# Patient Record
Sex: FEMALE | Race: WHITE | NOT HISPANIC OR LATINO | URBAN - METROPOLITAN AREA
[De-identification: names, ages, dates, MRNs, and addresses within clinical notes are randomized per-mention and may not be internally consistent; named-entity substitution may affect disease eponyms.]

---

## 2017-09-20 ENCOUNTER — OUTPATIENT (OUTPATIENT)
Dept: OUTPATIENT SERVICES | Facility: HOSPITAL | Age: 62
LOS: 1 days | Discharge: HOME | End: 2017-09-20

## 2017-09-20 DIAGNOSIS — C83.58 LYMPHOBLASTIC (DIFFUSE) LYMPHOMA, LYMPH NODES OF MULTIPLE SITES: ICD-10-CM

## 2017-09-25 ENCOUNTER — OUTPATIENT (OUTPATIENT)
Dept: OUTPATIENT SERVICES | Facility: HOSPITAL | Age: 62
LOS: 1 days | Discharge: HOME | End: 2017-09-25

## 2017-09-25 DIAGNOSIS — R13.10 DYSPHAGIA, UNSPECIFIED: ICD-10-CM

## 2021-08-05 PROBLEM — Z00.00 ENCOUNTER FOR PREVENTIVE HEALTH EXAMINATION: Status: ACTIVE | Noted: 2021-08-05

## 2021-09-30 ENCOUNTER — APPOINTMENT (OUTPATIENT)
Dept: UROGYNECOLOGY | Facility: CLINIC | Age: 66
End: 2021-09-30
Payer: MEDICARE

## 2021-09-30 VITALS
SYSTOLIC BLOOD PRESSURE: 155 MMHG | DIASTOLIC BLOOD PRESSURE: 93 MMHG | HEART RATE: 56 BPM | WEIGHT: 203 LBS | BODY MASS INDEX: 34.66 KG/M2 | HEIGHT: 64 IN

## 2021-09-30 DIAGNOSIS — Z87.19 PERSONAL HISTORY OF OTHER DISEASES OF THE DIGESTIVE SYSTEM: ICD-10-CM

## 2021-09-30 DIAGNOSIS — Z78.9 OTHER SPECIFIED HEALTH STATUS: ICD-10-CM

## 2021-09-30 DIAGNOSIS — Z85.41 PERSONAL HISTORY OF MALIGNANT NEOPLASM OF CERVIX UTERI: ICD-10-CM

## 2021-09-30 DIAGNOSIS — E78.00 PURE HYPERCHOLESTEROLEMIA, UNSPECIFIED: ICD-10-CM

## 2021-09-30 DIAGNOSIS — Z82.49 FAMILY HISTORY OF ISCHEMIC HEART DISEASE AND OTHER DISEASES OF THE CIRCULATORY SYSTEM: ICD-10-CM

## 2021-09-30 DIAGNOSIS — K21.9 GASTRO-ESOPHAGEAL REFLUX DISEASE W/OUT ESOPHAGITIS: ICD-10-CM

## 2021-09-30 DIAGNOSIS — Z83.42 FAMILY HISTORY OF FAMILIAL HYPERCHOLESTEROLEMIA: ICD-10-CM

## 2021-09-30 DIAGNOSIS — Z80.9 FAMILY HISTORY OF MALIGNANT NEOPLASM, UNSPECIFIED: ICD-10-CM

## 2021-09-30 DIAGNOSIS — E07.9 DISORDER OF THYROID, UNSPECIFIED: ICD-10-CM

## 2021-09-30 DIAGNOSIS — I10 ESSENTIAL (PRIMARY) HYPERTENSION: ICD-10-CM

## 2021-09-30 DIAGNOSIS — K58.9 IRRITABLE BOWEL SYNDROME W/OUT DIARRHEA: ICD-10-CM

## 2021-09-30 DIAGNOSIS — M72.2 PLANTAR FASCIAL FIBROMATOSIS: ICD-10-CM

## 2021-09-30 DIAGNOSIS — Z83.49 FAMILY HISTORY OF OTHER ENDOCRINE, NUTRITIONAL AND METABOLIC DISEASES: ICD-10-CM

## 2021-09-30 DIAGNOSIS — R15.9 FULL INCONTINENCE OF FECES: ICD-10-CM

## 2021-09-30 DIAGNOSIS — Z87.440 PERSONAL HISTORY OF URINARY (TRACT) INFECTIONS: ICD-10-CM

## 2021-09-30 DIAGNOSIS — Z85.79 PERSONAL HISTORY OF OTHER MALIGNANT NEOPLASMS OF LYMPHOID, HEMATOPOIETIC AND RELATED TISSUES: ICD-10-CM

## 2021-09-30 LAB
BILIRUB UR QL STRIP: NEGATIVE
CLARITY UR: CLEAR
COLLECTION METHOD: NORMAL
GLUCOSE UR-MCNC: NEGATIVE
HCG UR QL: 0.2 EU/DL
HGB UR QL STRIP.AUTO: NORMAL
KETONES UR-MCNC: NEGATIVE
LEUKOCYTE ESTERASE UR QL STRIP: NEGATIVE
NITRITE UR QL STRIP: NEGATIVE
PH UR STRIP: 5
PROT UR STRIP-MCNC: NEGATIVE
SP GR UR STRIP: 1

## 2021-09-30 PROCEDURE — 99205 OFFICE O/P NEW HI 60 MIN: CPT

## 2021-09-30 PROCEDURE — 81003 URINALYSIS AUTO W/O SCOPE: CPT | Mod: QW

## 2021-09-30 PROCEDURE — 51701 INSERT BLADDER CATHETER: CPT

## 2021-09-30 RX ORDER — ESTRADIOL 0.1 MG/G
0.1 CREAM VAGINAL
Qty: 1 | Refills: 3 | Status: ACTIVE | COMMUNITY
Start: 2021-09-30 | End: 1900-01-01

## 2021-09-30 RX ORDER — LEVOTHYROXINE SODIUM 137 UG/1
137 TABLET ORAL
Refills: 0 | Status: ACTIVE | COMMUNITY

## 2021-09-30 RX ORDER — ATORVASTATIN CALCIUM 10 MG/1
10 TABLET, FILM COATED ORAL
Refills: 0 | Status: ACTIVE | COMMUNITY

## 2021-09-30 RX ORDER — SULFAMETHOXAZOLE AND TRIMETHOPRIM 800; 160 MG/1; MG/1
800-160 TABLET ORAL
Qty: 14 | Refills: 0 | Status: COMPLETED | COMMUNITY
Start: 2021-09-30 | End: 2021-10-07

## 2021-09-30 RX ORDER — TROSPIUM CHLORIDE 20 MG/1
20 TABLET, FILM COATED ORAL
Qty: 60 | Refills: 1 | Status: ACTIVE | COMMUNITY
Start: 2021-09-30 | End: 1900-01-01

## 2021-09-30 RX ORDER — OMEPRAZOLE 40 MG/1
40 CAPSULE, DELAYED RELEASE ORAL
Refills: 0 | Status: ACTIVE | COMMUNITY

## 2021-09-30 RX ORDER — METOPROLOL TARTRATE 25 MG/1
25 TABLET, FILM COATED ORAL
Refills: 0 | Status: ACTIVE | COMMUNITY

## 2021-10-03 LAB
APPEARANCE: CLEAR
BACTERIA UR CULT: NORMAL
BILIRUBIN URINE: NEGATIVE
BLOOD URINE: NEGATIVE
COLOR: COLORLESS
GLUCOSE QUALITATIVE U: NEGATIVE
KETONES URINE: NEGATIVE
LEUKOCYTE ESTERASE URINE: NEGATIVE
NITRITE URINE: NEGATIVE
PH URINE: 6
PROTEIN URINE: NEGATIVE
SPECIFIC GRAVITY URINE: 1.01
UROBILINOGEN URINE: NORMAL

## 2021-10-03 NOTE — COUNSELING
[FreeTextEntry1] : \par We will notify you of the urine results\par \par Please call the office if you feel like you have an infection so that we can arrange testing of your urine. \par \par Please start the bactrim (antibiotic) twice a day for 7 days\par \par Apply a pea size amount of the cream to the opening of the vagina every night for two weeks followed by three nights per week\par \par Please start the trospium (bladder medication) twice a day for the urgency and leakage\par \par Please call my office if you have any issues with the cost or side effects of the medication.\par \par For 4-5 days, cut one item from the list out of your diet.\par \par After 4-5 days, it it makes a difference, you have to decide if it is worth keeping it out of your diet to help with the urinary issues.\par \par If it does not make a difference, you should add it back into your diet and remove another item for another 4-5 days.\par \par Bowel recipe every night for stool control\par \par Please schedule a 6 week med check with my PARosio (TESSY)\par \par Referral to pelvic floor physical therapy\par \par [facility]\par [address], [address]\[place] [address]\par Phone: [phone]\par Fax: [phone]\par

## 2021-10-03 NOTE — DISCUSSION/SUMMARY
[FreeTextEntry1] : \par Mixed incontinence-\par The pathophysiology of the above condition was discussed with the patient. The patient was given information and education on pelvic floor muscle exercises/rehabilitation, avoidance of dietary bladder irritants, and other strategies to improve bladder control, such as scheduled voiding. She was counseled regarding further management strategies for overactive bladder and urge incontinence including pelvic floor physical therapy, medications or surgical management. The patient voiced understanding and agrees with diet changes, referral to PT and medical management. The risks and benefits of trospium was reviewed. We will follow up on her urine tests.\par \par Myalgia-\par Discussed the pathophysiology of the above condition. Reviewed management options including medications (oral or vaginal suppository), injections, pelvic floor physical therapy, or referral for possible pudendal nerve blocks. The patient agrees to a referral to PT. \par \par Fecal incontinence-\par The patient was counseled about her fecal incontinence.  We discussed the importance of dietary fiber, hydration, physical activity, pelvic floor rehabilitation medication and minimally invasive surgical procedures.  She was given a handout with specific information about dietary fiber and ways to relieve fecal incontinence as well as discussed pelvic floor rehabilitation. If her symptoms do not respond, we will consider interstim or peripheral tibial nerve stimulation.\par \par History of UTI-\par Advised the patient that recurrent UTIs are defined as having 3 or more positive urine culture in 1 year or 2 or more in 6 months, which she has not had. Advised to call the office if she feels like she has an infection so that we can arrange testing of her urine. If she keeps getting infections then I will recommend a workup of further evaluation of her kidneys and bladder and further prevention treatment including estrogen vaginal cream and daily antibiotic suppression. The patient voiced understanding and agrees with the plan.\par \par Atrophic vaginitis-\par We reviewed the risks, benefits, alternatives and indications of local estrogen therapy and I gave her a handout that covers this information. She does opt to begin this therapy. I have given her a prescription and specific instructions on how to use the estrogen cream, applied with a finger at a low dose for urogenital atrophy.\par \par

## 2021-10-03 NOTE — HISTORY OF PRESENT ILLNESS
[FreeTextEntry1] : \par Pt with pelvic floor dysfunction here for urogynecologic evaluation. She describes: \par Referring provider: Dr Rm Small\par \par Chief PFD: urgency and frequency\par \par Pelvic organ prolapse: s/p jaylon, s/p TLH-USO for hx of cervical cancer (no chemo or radiation), reports intermittent heaviness in the vagina, denies splinting\par Stress urinary incontinence: with cough, sneeze, TESSY: U>S\par Overactive bladder syndrome: Urinates every 2 hours secondary to incontinence, daily urge incontinence, daily eneuresis, for years, worsened since hip surgery, no prior treatment, no glaucoma. \par Voiding dysfunction: Denies incomplete bladder emptying or hesitancy \par Lower urinary tract/vaginal symptoms: 2 UTIs in last year. Reports hematuria approximately one week ago, + dysuria, no bladder pain \par Fecal incontinence: once a week, soft stool\par Defecatory dysfunction: sausage\par Sexual dysfunction: Reports intermittent sexual activity due to pain at the opening and deeper. Reports intermittent leakage of urine during penetration.  \par Pelvic pain: denies\par Vaginal dryness present. Had previously used premarin (vaginal) for approximately 5 years 25 years ago. \par \par Her pelvic floor symptoms are significantly bothersome and negatively impacting her quality of life. \par \par

## 2021-10-03 NOTE — PHYSICAL EXAM
[Chaperone Present] : A chaperone was present in the examining room during all aspects of the physical examination [FreeTextEntry1] : Void:  225 cc\par PVR:  30 cc\par Urethra was prepped in sterile fashion and then a sterile catheter was used by me to drain the bladder.\par  \par Well healed incision:  RUQ and laparoscopic\par Normal perineal sensation\par Normal perineal reflexes\par Negative cough stress test\par Positive atrophy\par No prolapse\par Positive urethral hypermobility\par Positive bilateral levator ani spasm,  positive tenderness\par No urethral tenderness\par No bladder tenderness\par No cuff tenderness\par Surgically absent uterus and cervix\par 1/5 Kegel\par

## 2021-10-04 ENCOUNTER — NON-APPOINTMENT (OUTPATIENT)
Age: 66
End: 2021-10-04

## 2021-11-29 ENCOUNTER — APPOINTMENT (OUTPATIENT)
Dept: UROGYNECOLOGY | Facility: CLINIC | Age: 66
End: 2021-11-29
Payer: MEDICARE

## 2021-11-29 VITALS — SYSTOLIC BLOOD PRESSURE: 141 MMHG | DIASTOLIC BLOOD PRESSURE: 87 MMHG | HEART RATE: 67 BPM

## 2021-11-29 VITALS
DIASTOLIC BLOOD PRESSURE: 88 MMHG | HEART RATE: 63 BPM | SYSTOLIC BLOOD PRESSURE: 151 MMHG | WEIGHT: 203 LBS | HEIGHT: 64 IN | BODY MASS INDEX: 34.66 KG/M2

## 2021-11-29 DIAGNOSIS — N39.46 MIXED INCONTINENCE: ICD-10-CM

## 2021-11-29 DIAGNOSIS — M79.18 MYALGIA, OTHER SITE: ICD-10-CM

## 2021-11-29 DIAGNOSIS — N95.2 POSTMENOPAUSAL ATROPHIC VAGINITIS: ICD-10-CM

## 2021-11-29 PROCEDURE — 99213 OFFICE O/P EST LOW 20 MIN: CPT

## 2021-11-29 NOTE — DISCUSSION/SUMMARY
[FreeTextEntry1] : Mixed Incontinence-\par Cont pelvic floor exercises\par If feels that she wants to try Trospium, pt will call for Rx at that time. \par Will return in 6 months for follow up or earlier if she has any issues.\par \par Vaginal atrophy\par Advised to continue to apply a pea size amount of the cream to the opening of the vagina three nights per week.\par \par Myalgia\par Cont pelvic exercises.

## 2021-11-29 NOTE — COUNSELING
[FreeTextEntry1] : If you feel like you have an infection it is important for you to call our office and we will arrange testing of your urine.\par \par Please continue pelvic floor strengthening exercises. \par \par If you decide to try medication for overactive bladder symptoms, please call for a prescription at that time. \par \par Please continue to apply a pea size amount to the opening of the vagina three times a week. \par \par Please call my office if you have any issues with the cost or side effects of the medication. \par \par Schedule a 6 months follow up med check appointment. \par

## 2021-11-29 NOTE — HISTORY OF PRESENT ILLNESS
[FreeTextEntry1] : Patient is here for 6 weeks med check for urge incontinence.\par Last seen on 9/30/21 as a new pt with urgency and frequency. \par \par s/p jaylon, s/p TLH-USO for hx of cervical cancer (no chemo or radiation)\par no glaucoma\par No prolapse\par \par Trospium 20mg BID\par bowel recipe\par PT\par Estrace cream\par \par Today, patient states that she did not start Trospium. SHe went for PT and did 4 sessions (do not take her insurance). Feels that PT has helped her greatly with muscles strengthening. Does not leak as much, her pads are not as wet. Doing the exercises on her own at home now. Tried bowel recipe with good help. Does not feel that she needs Trospium at this time.  Patient does not feel she has an infection.\par \par

## 2023-01-19 ENCOUNTER — NON-APPOINTMENT (OUTPATIENT)
Age: 68
End: 2023-01-19

## 2025-08-07 ENCOUNTER — OUTPATIENT (OUTPATIENT)
Dept: OUTPATIENT SERVICES | Facility: HOSPITAL | Age: 70
LOS: 1 days | Discharge: HOME | End: 2025-08-07
Payer: MEDICARE

## 2025-08-07 VITALS — RESPIRATION RATE: 19 BRPM | SYSTOLIC BLOOD PRESSURE: 119 MMHG | HEART RATE: 59 BPM | DIASTOLIC BLOOD PRESSURE: 79 MMHG

## 2025-08-07 VITALS
SYSTOLIC BLOOD PRESSURE: 135 MMHG | HEART RATE: 62 BPM | HEIGHT: 63 IN | DIASTOLIC BLOOD PRESSURE: 86 MMHG | TEMPERATURE: 99 F | WEIGHT: 192.02 LBS | OXYGEN SATURATION: 99 % | RESPIRATION RATE: 20 BRPM

## 2025-08-07 DIAGNOSIS — Z98.890 OTHER SPECIFIED POSTPROCEDURAL STATES: Chronic | ICD-10-CM

## 2025-08-07 DIAGNOSIS — Z90.710 ACQUIRED ABSENCE OF BOTH CERVIX AND UTERUS: Chronic | ICD-10-CM

## 2025-08-07 DIAGNOSIS — H25.11 AGE-RELATED NUCLEAR CATARACT, RIGHT EYE: ICD-10-CM

## 2025-08-07 DIAGNOSIS — Z90.89 ACQUIRED ABSENCE OF OTHER ORGANS: Chronic | ICD-10-CM

## 2025-08-07 PROCEDURE — V2632: CPT

## 2025-08-07 RX ORDER — OMEPRAZOLE 20 MG/1
0 CAPSULE, DELAYED RELEASE ORAL
Refills: 0 | DISCHARGE

## 2025-08-07 RX ORDER — LEVOTHYROXINE SODIUM 300 MCG
0 TABLET ORAL
Refills: 0 | DISCHARGE

## 2025-08-11 DIAGNOSIS — H26.8 OTHER SPECIFIED CATARACT: ICD-10-CM

## 2025-08-11 DIAGNOSIS — K21.9 GASTRO-ESOPHAGEAL REFLUX DISEASE WITHOUT ESOPHAGITIS: ICD-10-CM

## 2025-08-11 DIAGNOSIS — Z79.890 HORMONE REPLACEMENT THERAPY: ICD-10-CM

## 2025-08-11 DIAGNOSIS — E03.9 HYPOTHYROIDISM, UNSPECIFIED: ICD-10-CM

## 2025-08-11 DIAGNOSIS — I10 ESSENTIAL (PRIMARY) HYPERTENSION: ICD-10-CM
